# Patient Record
Sex: MALE | Race: WHITE | URBAN - METROPOLITAN AREA
[De-identification: names, ages, dates, MRNs, and addresses within clinical notes are randomized per-mention and may not be internally consistent; named-entity substitution may affect disease eponyms.]

---

## 2020-10-08 NOTE — IMPRESSION/PLAN
Impression: CSCR vs atypical macular degeneration, wet OS. Status: Symptomatic.
s/p Avastin x 27 last 08/13/2020 (consented 03/05/2020) Plan: A 5 week interval was too long in the past. The patient had improvement with injections with Dr. Charmaine Moise in the past. The patient feels his VA OS is improved with Avastin. Exam and OCT reveal SRF OS (218 microns, from 391 microns). An IVFA from 01/16/2020 demonstrated pinpoint leakage OS in the superior macula. Fundus photos from 01/16/2020 demonstrated no IRH. An ICGA from 10/24/18 demonstrated a polyp. Discussed options of PDT vs Avastin vs observation. Recommend Avastin. R/B/A discussed with patient. The risks of Avastin were discussed, including off-label use and compounding pharmacy risks, and the patient elects to proceed with Avastin. After 2% Subconjunctival anesthesia & Betadine prep, 1.25mg of Avastin was injected in the eye. Cold compress and Tylenol suggested for pain if needed. The patient will consider PDT OS if there is no response to Avastin Return in 7-8 weeks for re eval SRF, OCT OU

## 2020-12-03 NOTE — IMPRESSION/PLAN
Impression: CSCR vs atypical macular degeneration, wet OS. Status: Symptomatic.
s/p Avastin x 28 last 10/8/2020 (consented 03/05/2020) Plan: A 5 week interval was too long in the past. The patient had improvement with injections with Dr. Omari De La Torre in the past. The patient feels his VA OS is improved with Avastin. Exam and OCT reveal SRF OS (215 microns, from 391 microns). An IVFA from 01/16/2020 demonstrated pinpoint leakage OS in the superior macula. Fundus photos from 01/16/2020 demonstrated no IRH. An ICGA from 10/24/18 demonstrated a polyp. Discussed options of PDT vs Avastin vs observation. Recommend Avastin. R/B/A discussed with patient. The risks of Avastin were discussed, including off-label use and compounding pharmacy risks, and the patient elects to proceed with Avastin. After 2% Subconjunctival anesthesia & Betadine prep, 1.25mg of Avastin was injected in the eye. Cold compress and Tylenol suggested for pain if needed. The patient will consider PDT OS if there is no response to Avastin Return in 7-8 weeks for re eval SRF, OCT OU

## 2021-01-28 NOTE — IMPRESSION/PLAN
Impression: CSCR vs atypical macular degeneration, wet OS. Status: Symptomatic.
s/p Avastin x 29 last 12/3/2020 (consented 03/05/2020) Plan: A 5 week interval was too long in the past. The patient had improvement with injections with Dr. Colleen Elmore in the past. The patient feels his VA OS is improved with Avastin. Exam and OCT reveal SRF OS (214 microns, from 391 microns). An IVFA from 01/16/2020 demonstrated pinpoint leakage OS in the superior macula. Fundus photos from 01/16/2020 demonstrated no IRH. An ICGA from 10/24/18 demonstrated a polyp. Discussed options of PDT vs Avastin vs observation. Recommend Avastin. R/B/A discussed with patient. The risks of Avastin were discussed, including off-label use and compounding pharmacy risks, and the patient elects to proceed with Avastin. After 2% Subconjunctival anesthesia & Betadine prep, 1.25mg of Avastin was injected in the eye. Cold compress and Tylenol suggested for pain if needed. The patient will consider PDT OS if there is no response to Avastin Return in 7-8 weeks for re eval SRF, OCT OU, IVFA OS 1st

## 2021-03-17 NOTE — IMPRESSION/PLAN
Impression: CSCR vs atypical macular degeneration, wet OS. Status: Symptomatic.
s/p Avastin x 30  last 01/28/2021 (consented 03/05/2020) Plan: A 5 week interval was too long in the past. The patient had improvement with injections with Dr. Zeina Gavin in the past. The patient feels his VA OS is improved with Avastin. Exam and OCT reveal SRF OS (249 microns, from 391 microns). An IVFA from 03/17/2021 demonstrated pinpoint leakage OS in the superior macula. Fundus photos from 03/17/2021 demonstrated no IRH. An ICGA from 10/24/18 demonstrated a polyp. Discussed options of PDT vs Avastin vs observation. Recommend Avastin. R/B/A discussed with patient. The risks of Avastin were discussed, including off-label use and compounding pharmacy risks, and the patient elects to proceed with Avastin. After 2% Subconjunctival anesthesia & Betadine prep, 1.25mg of Avastin was injected in the eye. Cold compress and Tylenol suggested for pain if needed. The patient will consider PDT OS if there is no response to Avastin Return in 7-8 weeks for re eval SRF, OCT OU

## 2021-05-06 NOTE — IMPRESSION/PLAN
Impression: CSCR vs atypical macular degeneration, wet OS. Status: Symptomatic.
s/p Avastin x 31   last 03/17/2021  Plan: A 5 week interval was too long in the past. The patient had improvement with injections with Dr. Apple Hernandez in the past. The patient feels his VA OS is improved with Avastin. Exam and OCT reveal SRF OS (221 microns, from 391 microns). An IVFA from 03/17/2021 demonstrated pinpoint leakage OS in the superior macula. Fundus photos from 03/17/2021 demonstrated no IRH. An ICGA from 10/24/18 demonstrated a polyp. Discussed options of PDT vs Avastin vs observation. Recommend Avastin. R/B/A discussed with patient. The risks of Avastin were discussed, including off-label use and compounding pharmacy risks, and the patient elects to proceed with Avastin. After 2% Subconjunctival anesthesia & Betadine prep, 1.25mg of Avastin was injected in the eye. Cold compress and Tylenol suggested for pain if needed. The patient will consider PDT OS if there is no response to Avastin Return in 7-8 weeks for re eval SRF, OCT OU

## 2021-07-01 NOTE — IMPRESSION/PLAN
Impression: CSCR vs atypical macular degeneration, wet OS. Status: Symptomatic.
s/p Avastin x 32 last 05/06/2021 Plan: The patient notes blurring. A 5 week interval was too long in the past. The patient had improvement with injections with Dr. Thuy Sutherland in the past. The patient feels his VA OS is improved with Avastin. Exam and OCT reveal SRF OS (221 microns, from 391 microns). An IVFA from 03/17/2021 demonstrated pinpoint leakage OS in the superior macula. Fundus photos from 03/17/2021 demonstrated no IRH. An ICGA from 10/24/18 demonstrated a polyp. Discussed options of PDT vs Avastin vs observation. Recommend Avastin. R/B/A discussed with patient. The risks of Avastin were discussed, including off-label use and compounding pharmacy risks, and the patient elects to proceed with Avastin. After 2% Subconjunctival anesthesia & Betadine prep, 1.25mg of Avastin was injected in the eye. Cold compress and Tylenol suggested for pain if needed. The patient will consider PDT OS if there is no response to Avastin Return in 7-8 weeks for re eval SRF, OCT OU

## 2021-08-26 NOTE — IMPRESSION/PLAN
Impression: CSCR vs atypical macular degeneration, wet OS. Status: Symptomatic.
s/p Avastin x 33 last 07/01/2021 Plan: The patient notes worsening blurring. A 5 week interval was too long in the past. The patient had improvement with injections with Dr. Gurpreet Garza in the past. The patient feels his VA OS is improved with Avastin. Exam and OCT reveal SRF OS (257 microns, from 391 microns). An IVFA from 03/17/2021 demonstrated pinpoint leakage OS in the superior macula. Fundus photos from 03/17/2021 demonstrated no IRH. An ICGA from 10/24/18 demonstrated a polyp. Discussed options of PDT vs Avastin vs observation. Recommend Avastin. R/B/A discussed with patient. The risks of Avastin were discussed, including off-label use and compounding pharmacy risks, and the patient elects to proceed with Avastin. After 2% Subconjunctival anesthesia & Betadine prep, 1.25mg of Avastin was injected in the eye. Cold compress and Tylenol suggested for pain if needed. The patient will consider PDT OS if there is no response to Avastin Return in 6 weeks for re eval SRF, OCT OU

## 2021-10-08 NOTE — IMPRESSION/PLAN
Impression: CSCR vs atypical macular degeneration, wet OS. Status: Symptomatic.
s/p Avastin x 34  last 08/26/2021  Plan: The patient notes worsening blurring. A 5 week interval was too long in the past. The patient had improvement with injections with Dr. Cesar Iverson in the past. The patient feels his VA OS is improved with Avastin. Exam and OCT reveal SRF OS (236 microns, from 391 microns). An IVFA from 03/17/2021 demonstrated pinpoint leakage OS in the superior macula. Fundus photos from 03/17/2021 demonstrated no IRH. An ICGA from 10/24/18 demonstrated a polyp. Discussed options of PDT vs Avastin vs observation. Recommend Avastin. R/B/A discussed with patient. The risks of Avastin were discussed, including off-label use and compounding pharmacy risks, and the patient elects to proceed with Avastin. After 2% Subconjunctival anesthesia & Betadine prep, 1.25mg of Avastin was injected in the eye. Cold compress and Tylenol suggested for pain if needed. The patient will consider PDT OS if there is no response to Avastin Return in 6 weeks for re eval SRF, OCT OU

## 2021-11-18 NOTE — IMPRESSION/PLAN
Impression: CSCR vs atypical macular degeneration, wet OS. Status: Symptomatic.
s/p Avastin x 35  last 10/08/2021 Plan:  A 5 week interval was too long in the past. The patient had improvement with injections with Dr. Huang Brown in the past. The patient feels his VA OS is improved with Avastin. Exam and OCT reveal SRF OS (213 microns, from 391 microns). An IVFA from 03/17/2021 demonstrated pinpoint leakage OS in the superior macula. Fundus photos from 03/17/2021 demonstrated no IRH. An ICGA from 10/24/18 demonstrated a polyp. Discussed options of PDT vs Avastin vs observation. Recommend Avastin. R/B/A discussed with patient. The risks of Avastin were discussed, including off-label use and compounding pharmacy risks, and the patient elects to proceed with Avastin. After 2% Subconjunctival anesthesia & Betadine prep, 1.25mg of Avastin was injected in the eye. Cold compress and Tylenol suggested for pain if needed. The patient will consider PDT OS if there is no response to Avastin Return in 6 weeks for re eval SRF, OCT OU

## 2021-12-16 NOTE — IMPRESSION/PLAN
Impression: CSCR vs atypical macular degeneration, wet OS. Status: Symptomatic.
s/p Avastin x 36  last 11/18/2021  Plan: A 5 week interval was too long in the past. The patient had improvement with injections with Dr. Yariel Lowery in the past. The patient feels his VA OS is improved with Avastin. Exam and OCT reveal SRF OS (244 microns, from 391 microns). An IVFA from 03/17/2021 demonstrated pinpoint leakage OS in the superior macula. Fundus photos from 03/17/2021 demonstrated no IRH. An ICGA from 10/24/18 demonstrated a polyp. Discussed options of PDT vs Avastin vs observation. Recommend Avastin. R/B/A discussed with patient. The risks of Avastin were discussed, including off-label use and compounding pharmacy risks, and the patient elects to proceed with Avastin. After 2% Subconjunctival anesthesia & Betadine prep, 1.25mg of Avastin was injected in the eye. Cold compress and Tylenol suggested for pain if needed. The patient will consider PDT OS if there is no response to Avastin Return in 6 weeks for re eval SRF, OCT OU, IVFA OS 1st

## 2022-01-27 NOTE — IMPRESSION/PLAN
Impression: CSCR vs atypical macular degeneration, wet OS. Status: Symptomatic.
s/p Avastin x 37  last 12/16/2021  Plan: A 5 week interval was too long in the past. The patient had improvement with injections with Dr. Vanessa Jaime in the past. The patient feels his VA OS is improved with Avastin. Exam and OCT reveal SRF OS (217 microns, from 391 microns). An IVFA from 01/27/2022 demonstrated pinpoint leakage OS in the superior macula. Fundus photos from 01/27/2022 demonstrated no IRH. An ICGA from 10/24/18 demonstrated a polyp. Discussed options of PDT vs Avastin vs observation. Recommend Avastin. R/B/A discussed with patient. The risks of Avastin were discussed, including off-label use and compounding pharmacy risks, and the patient elects to proceed with Avastin. After 2% Subconjunctival anesthesia & Betadine prep, 1.25mg of Avastin was injected in the eye. Cold compress and Tylenol suggested for pain if needed. The patient will consider PDT OS if there is no response to Avastin Return in 6 weeks for re eval SRF, OCT OU

## 2022-03-10 NOTE — IMPRESSION/PLAN
Impression: CSCR vs atypical macular degeneration, wet OS. Status: Symptomatic.
s/p Avastin x 38  last 01/27/2022 Plan: A 5 week interval was too long in the past. The patient had improvement with injections with Dr. Destiny Tomas in the past. The patient feels his VA OS is improved with Avastin. Exam and OCT reveal SRF OS (213 microns, from 391 microns). An IVFA from 01/27/2022 demonstrated pinpoint leakage OS in the superior macula. Fundus photos from 01/27/2022 demonstrated no IRH. An ICGA from 10/24/18 demonstrated a polyp. Discussed options of PDT vs Avastin vs observation. Recommend Avastin. R/B/A discussed with patient. The risks of Avastin were discussed, including off-label use and compounding pharmacy risks, and the patient elects to proceed with Avastin. After 2% Subconjunctival anesthesia & Betadine prep, 1.25mg of Avastin was injected in the eye. Cold compress and Tylenol suggested for pain if needed. The patient will consider PDT OS if there is no response to Avastin Return in 6 weeks for re eval SRF, OCT OU

## 2022-05-05 ENCOUNTER — OFFICE VISIT (OUTPATIENT)
Dept: URBAN - METROPOLITAN AREA CLINIC 41 | Facility: CLINIC | Age: 77
End: 2022-05-05
Payer: MEDICARE

## 2022-05-05 DIAGNOSIS — H35.712 CENTRAL SEROUS CHORIORETINOPATHY, LEFT EYE: ICD-10-CM

## 2022-05-05 DIAGNOSIS — H35.3221 EXDTVE AGE-REL MCLR DEGN, LEFT EYE, WITH ACTV CHRDL NEOVAS: Primary | ICD-10-CM

## 2022-05-05 DIAGNOSIS — H43.812 VITREOUS DEGENERATION, LEFT EYE: ICD-10-CM

## 2022-05-05 DIAGNOSIS — H25.13 AGE-RELATED NUCLEAR CATARACT, BILATERAL: ICD-10-CM

## 2022-05-05 DIAGNOSIS — H04.123 DRY EYE SYNDROME OF BILATERAL LACRIMAL GLANDS: ICD-10-CM

## 2022-05-05 PROCEDURE — 99214 OFFICE O/P EST MOD 30 MIN: CPT | Performed by: OPHTHALMOLOGY

## 2022-05-05 PROCEDURE — 67028 INJECTION EYE DRUG: CPT | Performed by: OPHTHALMOLOGY

## 2022-05-05 ASSESSMENT — INTRAOCULAR PRESSURE
OS: 8
OD: 10

## 2022-05-05 NOTE — IMPRESSION/PLAN
Impression: CSCR vs atypical macular degeneration, wet OS. Status: Symptomatic.
s/p Avastin x 39  last 03/10/2022 Plan: A 5 week interval was too long in the past. The patient had improvement with injections with Dr. Zeina Gavin in the past. The patient feels his VA OS is improved with Avastin. Exam and OCT reveal SRF OS (213 microns, from 391 microns). An IVFA from 01/27/2022 demonstrated pinpoint leakage OS in the superior macula. Fundus photos from 01/27/2022 demonstrated no IRH. An ICGA from 10/24/18 demonstrated a polyp. Discussed options of PDT vs Avastin vs observation. Recommend Avastin. R/B/A discussed with patient. The risks of Avastin were discussed, including off-label use and compounding pharmacy risks, and the patient elects to proceed with Avastin. After 2% Subconjunctival anesthesia & Betadine prep, 1.25mg of Avastin was injected in the eye. Cold compress and Tylenol suggested for pain if needed. The patient will consider PDT OS if there is no response to Avastin Return in 6 weeks for re eval SRF, OCT OU, IVFA OS 1st

## 2022-06-30 NOTE — IMPRESSION/PLAN
Impression: CSCR vs atypical macular degeneration, wet OS. Status: Symptomatic.
s/p Avastin x 40  last 05/05/2022  Plan: A 5 week interval was too long in the past. The patient had improvement with injections with Dr. Paty Salinas in the past. The patient feels his VA OS is improved with Avastin. Exam and OCT reveal SRF OS (226 microns, from 391 microns). An IVFA from 01/27/2022 demonstrated pinpoint leakage OS in the superior macula. Fundus photos from 01/27/2022 demonstrated no IRH. An ICGA from 10/24/18 demonstrated a polyp. Discussed options of PDT vs Avastin vs observation. Recommend Avastin. R/B/A discussed with patient. The risks of Avastin were discussed, including off-label use and compounding pharmacy risks, and the patient elects to proceed with Avastin. After 2% Subconjunctival anesthesia & Betadine prep, 1.25mg of Avastin was injected in the eye. Cold compress and Tylenol suggested for pain if needed. The patient will consider PDT OS if there is no response to Avastin Return in 6-8 weeks for re eval SRF, OCT OU, IVFA OS 1st

## 2022-08-25 NOTE — IMPRESSION/PLAN
Impression: CSCR vs atypical macular degeneration, wet OS. Status: Symptomatic.
s/p Avastin x 41  last 06/30/2022  Plan: A 5 week interval was too long in the past. The patient had improvement with injections with Dr. Ruel Antonio in the past. The patient feels his VA OS is improved with Avastin. Exam and OCT reveal SRF OS (217 microns, from 391 microns). An IVFA from 08/25/2022 demonstrated pinpoint leakage OS in the superior macula. Fundus photos from 08/25/2022 demonstrated no IRH. An ICGA from 10/24/18 demonstrated a polyp. Discussed options of PDT vs Avastin vs observation. Recommend Avastin. R/B/A discussed with patient. The risks of Avastin were discussed, including off-label use and compounding pharmacy risks, and the patient elects to proceed with Avastin. After 2% Subconjunctival anesthesia & Betadine prep, 1.25mg of Avastin was injected in the eye. Cold compress and Tylenol suggested for pain if needed. The patient will consider PDT OS if there is no response to Avastin Return in 6-8 weeks for re eval SRF, OCT OU

## 2022-10-20 NOTE — IMPRESSION/PLAN
Impression: CSCR vs atypical macular degeneration, wet OS. Status: Symptomatic.
s/p Avastin x 42  last 08/25/2022  Plan: A 5 week interval was too long in the past. The patient had improvement with injections with Dr. Cesar Iverson in the past. The patient feels his VA OS is improved with Avastin. Exam and OCT reveal SRF OS (prior 217 microns, from 391 microns). An IVFA from 08/25/2022 demonstrated pinpoint leakage OS in the superior macula. Fundus photos from 08/25/2022 demonstrated no IRH. An ICGA from 10/24/18 demonstrated a polyp. Discussed options of PDT vs Avastin vs observation. Recommend Avastin. R/B/A discussed with patient. The risks of Avastin were discussed, including off-label use and compounding pharmacy risks, and the patient elects to proceed with Avastin. After 2% Subconjunctival anesthesia & Betadine prep, 1.25mg of Avastin was injected in the eye. Cold compress and Tylenol suggested for pain if needed. The patient will consider PDT OS if there is no response to Avastin Return in 6-8 weeks for re eval SRF, OCT OU

## 2022-12-01 NOTE — IMPRESSION/PLAN
Impression: CSCR vs atypical macular degeneration, wet OS. Status: Symptomatic.
s/p Avastin x 43  last 10/20/2022 Plan: A 5 week interval was too long in the past. The patient had improvement with injections with Dr. Farooq Palomo in the past. The patient feels his VA OS is improved with Avastin. Exam and OCT reveal SRF OS (210 microns, from 391 microns). An IVFA from 08/25/2022 demonstrated pinpoint leakage OS in the superior macula. Fundus photos from 08/25/2022 demonstrated no IRH. An ICGA from 10/24/18 demonstrated a polyp. Discussed options of PDT vs Avastin vs observation. Recommend Avastin. R/B/A discussed with patient. The risks of Avastin were discussed, including off-label use and compounding pharmacy risks, and the patient elects to proceed with Avastin. After 2% Subconjunctival anesthesia & Betadine prep, 1.25mg of Avastin was injected in the eye. Cold compress and Tylenol suggested for pain if needed. The patient will consider PDT OS if there is no response to Avastin Return in 6-8 weeks for re eval SRF, OCT OU

## 2023-01-26 NOTE — IMPRESSION/PLAN
Impression: CSCR vs atypical macular degeneration, wet OS. Status: Symptomatic.
s/p Avastin x 44  last 12/01/2022 Plan: A 5 week interval was too long in the past. The patient had improvement with injections with Dr. Sonny Leblanc in the past. The patient feels his VA OS is improved with Avastin. Exam and OCT reveal SRF OS (211 microns, from 391 microns). An IVFA from 08/25/2022 demonstrated pinpoint leakage OS in the superior macula. Fundus photos from 08/25/2022 demonstrated no IRH. An ICGA from 10/24/18 demonstrated a polyp. Discussed options of PDT vs Avastin vs observation. Recommend Avastin. R/B/A discussed with patient. The risks of Avastin were discussed, including off-label use and compounding pharmacy risks, and the patient elects to proceed with Avastin. After 2% Subconjunctival anesthesia & Betadine prep, 1.25mg of Avastin was injected in the eye. Cold compress and Tylenol suggested for pain if needed. The patient will consider PDT OS if there is no response to Avastin Return in 6-8 weeks for re eval SRF, OCT OU, IVFA OS 1st

## 2023-03-09 NOTE — IMPRESSION/PLAN
Impression: CSCR vs atypical macular degeneration, wet OS. Status: Symptomatic.
s/p Avastin x 45  last 01/26/2023 Plan: A 5 week interval was too long in the past. The patient had improvement with injections with Dr. Moisés Billings in the past. The patient feels his VA OS is improved with Avastin. Exam and OCT reveal SRF OS (239 microns, from 391 microns). An IVFA from 03/09/2023 demonstrated pinpoint leakage OS in the superior macula. Fundus photos from 03/09/2023 demonstrated no IRH. An ICGA from 10/24/18 demonstrated a polyp. Discussed options of PDT vs Avastin vs observation. Recommend Avastin. R/B/A discussed with patient. The risks of Avastin were discussed, including off-label use and compounding pharmacy risks, and the patient elects to proceed with Avastin. After 2% Subconjunctival anesthesia & Betadine prep, 1.25mg of Avastin was injected in the eye. Cold compress and Tylenol suggested for pain if needed. The patient will consider PDT OS if there is no response to Avastin. Carotenoids Return in 8-10 weeks for re eval SRF, OCT OU

## 2023-05-04 NOTE — IMPRESSION/PLAN
Impression: CSCR vs atypical macular degeneration, wet OS. Status: Symptomatic.
s/p Avastin x 46  last 03/09/2023 Plan: A 5 week interval was too long in the past. The patient had improvement with injections with Dr. Janes Noriega in the past. The patient feels his VA OS is improved with Avastin. Exam and OCT reveal SRF OS (266 microns, from 391 microns). An IVFA from 03/09/2023 demonstrated pinpoint leakage OS in the superior macula. Fundus photos from 03/09/2023 demonstrated no IRH. An ICGA from 10/24/18 demonstrated a polyp. Discussed options of PDT vs Avastin vs observation. Recommend Avastin. R/B/A discussed with patient. The risks of Avastin were discussed, including off-label use and compounding pharmacy risks, and the patient elects to proceed with Avastin. After 2% Subconjunctival anesthesia & Betadine prep, 1.25mg of Avastin was injected in the eye. Cold compress and Tylenol suggested for pain if needed. The patient will consider PDT OS if there is no response to Avastin. On carotenoids Return in 8-10 weeks for re eval SRF, OCT OU

## 2023-06-28 NOTE — IMPRESSION/PLAN
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 Exam demonstrates a PVD without any RD or RT on exam.  Discussed R/B/A of PPV vs observation for the floaters. The floaters are not debilitating and so observation was recommended. Reassurance provided. RDW discussed. Precautions discussed with the patient.

## 2023-06-28 NOTE — IMPRESSION/PLAN
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
s/p Avastin 05/04/2023-DYK Plan: Exam and OCT reveal SRF OS (244, 266 microns, from 391 microns). An IVFA from 03/09/2023 demonstrated pinpoint leakage OS in the superior macula. Fundus photos from 03/09/2023 demonstrated no IRH. An ICGA from 10/24/18 demonstrated a polyp. Recommend intravitreal Avastin today and switch to Vabysmo at next visit given persistent SRF on Avastin. R/B/A discussed and patient elects to proceed. Intravitreal Avastin injection was administered today without complication.   

RTC 8-10 wks OCT OU; re-eval with possible Vabysmo

## 2023-06-28 NOTE — IMPRESSION/PLAN
Impression: Age-related nuclear cataract, bilateral H25.13 Plan: Patient notes glare. Exam demonstrates a moderate cataract which has slowly progressed. Discussed R/B/A of surgery vs observation. Recommend observation. Warning symptoms discussed.